# Patient Record
Sex: FEMALE
[De-identification: names, ages, dates, MRNs, and addresses within clinical notes are randomized per-mention and may not be internally consistent; named-entity substitution may affect disease eponyms.]

---

## 2022-01-28 ENCOUNTER — APPOINTMENT (OUTPATIENT)
Dept: PEDIATRIC ALLERGY IMMUNOLOGY | Facility: CLINIC | Age: 52
End: 2022-01-28
Payer: COMMERCIAL

## 2022-01-28 VITALS
WEIGHT: 153.38 LBS | HEIGHT: 62 IN | TEMPERATURE: 97.3 F | DIASTOLIC BLOOD PRESSURE: 74 MMHG | BODY MASS INDEX: 28.22 KG/M2 | SYSTOLIC BLOOD PRESSURE: 110 MMHG

## 2022-01-28 DIAGNOSIS — L29.9 PRURITUS, UNSPECIFIED: ICD-10-CM

## 2022-01-28 DIAGNOSIS — L50.8 OTHER URTICARIA: ICD-10-CM

## 2022-01-28 PROBLEM — Z00.00 ENCOUNTER FOR PREVENTIVE HEALTH EXAMINATION: Status: ACTIVE | Noted: 2022-01-28

## 2022-01-28 PROCEDURE — 99203 OFFICE O/P NEW LOW 30 MIN: CPT

## 2022-01-28 NOTE — HISTORY OF PRESENT ILLNESS
[de-identified] : KYRIE BRADFORD is a 51 year female  with a history of hives, itchy skin. Pt states she started feeling itchy skin about a month ago, after scratching she developed black and blue and it took 2 weeks to get better. Itching was worse at night, slightly raise lesions. She is seeing her PCP a few days later whom gave her a dose of methyl dose pack. Itching as well as rash improved and black and blues cleared up. Few days after completion of methyl dose pack itching came back on upper arms, chest, with no black and blue marks this time. Change of temperature make her itch more, at no point she had swelling of the tongue, throat and lip.

## 2022-01-28 NOTE — SOCIAL HISTORY
[House] : [unfilled] lives in a house  [Central Forced Air] : heating provided by central forced air [Central] : air conditioning provided by central unit [Dog] : dog [Humidifier] : does not use a humidifier [Dehumidifier] : does not use a dehumidifier [Cockroaches] : Patient states that there are no cockroaches in the home [Dust Mite Covers] : does not have dust mite covers [Feather Pillows] : does not have feather pillows [Feather Comforter] : does not have a feather comforter [Bedroom] : not in the bedroom [Basement] : not in the basement [Living Area] : not in the living area

## 2022-01-28 NOTE — REASON FOR VISIT
[Initial Evaluation] : an initial evaluation of [Allergy Evaluation/ Skin Testing] : allergy evaluation and or skin testing [Family Member] : family member [Hives] : hives

## 2022-01-28 NOTE — ASSESSMENT
[FreeTextEntry1] : Pt is a 51 year old female with a history of itchy skin without any constitutional symptoms. Screening work up done by PCP did not revealed anything significant pointing to the cause of rash. I think she has a non significant pruritus and mild urticaria. I will treat her with Zyrtec 10 mg daily and Benadryl 25 mg as needed for the next several weeks and I advised her to avoid NSAIDs. She will call back in 5 days to give status.

## 2022-11-02 ENCOUNTER — APPOINTMENT (OUTPATIENT)
Dept: SURGERY | Facility: CLINIC | Age: 52
End: 2022-11-02

## 2022-11-02 VITALS
HEIGHT: 62 IN | TEMPERATURE: 97 F | SYSTOLIC BLOOD PRESSURE: 122 MMHG | BODY MASS INDEX: 29.26 KG/M2 | HEART RATE: 76 BPM | DIASTOLIC BLOOD PRESSURE: 80 MMHG | WEIGHT: 159 LBS | OXYGEN SATURATION: 98 %

## 2022-11-02 DIAGNOSIS — I10 ESSENTIAL (PRIMARY) HYPERTENSION: ICD-10-CM

## 2022-11-02 DIAGNOSIS — Z78.9 OTHER SPECIFIED HEALTH STATUS: ICD-10-CM

## 2022-11-02 PROCEDURE — 99203 OFFICE O/P NEW LOW 30 MIN: CPT

## 2022-11-02 NOTE — ASSESSMENT
[FreeTextEntry1] : 52F with anal fissure\par \par I discussed the pathology of anal fissure with the patient.  We discussed starting a fiber supplement, increasing water intake and using nifedipine cream.  We will see the patient back in 2 months.\par

## 2022-11-02 NOTE — HISTORY OF PRESENT ILLNESS
[FreeTextEntry1] : PT been seen for initial evaluation for hemorrhoids. Pt has PMH of HTN. PSH of gastric sleeve 7-8 years ago. PT reports about 3 years ago she started having hemorrhoidal bleeding on/off. Over the past 9 months bleeding started becoming more constant with spotting on Toilet paper with every BM. Reports Bm's 2x a week. PT reports poor fiber and poor water intake. Pt denies fever, chills, nausea, vomiting, abdominal pain, diarrhea, blood in stool, or unexpected weight loss. Pt denies a family history of colon cancer, rectal cancer, or inflammatory bowel disease. last colonoscopy 3 years ago when bleeding started with MD in [place]. PT does not remember doctors name or the office but reports the results were just hemorrhoids.

## 2022-11-02 NOTE — PHYSICAL EXAM
[Posterior] : posteriorly [Excoriation] : no perianal excoriation [Fistula] : no fistulas [Wart] : no warts [Ulcer ___ cm] : no ulcers [Pilonidal Cyst] : no pilonidal cysts [Tender, Swollen] : nontender, non-swollen [Thrombosed] : that was not thrombosed [Skin Tags] : there were no residual hemorrhoidal skin tags seen [Respiratory Effort] : normal respiratory effort [Normal Rate and Rhythm] : normal rate and rhythm [de-identified] : external exam shows a posterior midline fissure [de-identified] : awake, alert and in no acute distress

## 2023-01-04 ENCOUNTER — APPOINTMENT (OUTPATIENT)
Dept: SURGERY | Facility: CLINIC | Age: 53
End: 2023-01-04

## 2023-02-22 ENCOUNTER — APPOINTMENT (OUTPATIENT)
Dept: SURGERY | Facility: CLINIC | Age: 53
End: 2023-02-22
Payer: COMMERCIAL

## 2023-02-22 VITALS
TEMPERATURE: 97 F | BODY MASS INDEX: 29.26 KG/M2 | WEIGHT: 159 LBS | SYSTOLIC BLOOD PRESSURE: 110 MMHG | HEART RATE: 73 BPM | DIASTOLIC BLOOD PRESSURE: 76 MMHG | HEIGHT: 62 IN | OXYGEN SATURATION: 99 %

## 2023-02-22 PROCEDURE — 99214 OFFICE O/P EST MOD 30 MIN: CPT

## 2023-03-06 NOTE — HISTORY OF PRESENT ILLNESS
[FreeTextEntry1] : Patient is a 52-year-old female with a past medical history of hypertension, past surgical history of Gastric Sleeve who presents her follow-up of Anal fissure. On patient last visit she was found to have a posterior anal fissure and recommended conservative management with Nifedipine cream. She reports bowel movements that are soft every 1 to 2 days with fiber intake. She has had significant improvement of her pain but continues to have mild pain with bowel movements. Pt denies fever, chills, nausea, vomiting, abdominal pain, diarrhea, blood in stool, or unexpected weight loss. Pt denies a family history of colon cancer, rectal cancer, or inflammatory bowel disease. last colonoscopy 3 years ago when bleeding started with MD in [place]. PT does not remember doctors name or the office but reports the results were just hemorrhoids.

## 2023-03-06 NOTE — PHYSICAL EXAM
[Respiratory Effort] : normal respiratory effort [Normal Rate and Rhythm] : normal rate and rhythm [de-identified] : external exam shows a posterior midline fissure. Anal fissure posteriorly. Perianal Skin: no perianal excoriation, no fistulas, no warts, no ulcers and no pilonidal cysts. Nontender, non-swollen internal hemorrhoid. Nontender, non-swollen external hemorrhoid that was not thrombosed . there were no residual hemorrhoidal skin tags seen.  [de-identified] : awake, alert and in no acute distress.

## 2023-03-06 NOTE — ASSESSMENT
[FreeTextEntry1] : 52 year old female with Anal Fissure.\par \par Despite treatment with conservative management and Nifedipine cream patient has persistent symptoms. We discussed examination of the anorectum under anesthesia, Fissurectomy and Botox injections. All risks, benefits, and alternatives were discussed including bleeding, infection, damage of the sphincter muscle resulting in a degree of incontinence to the gastric stool and recurrence. Patient expressed understanding and was in agreement with the plan.

## 2023-03-06 NOTE — ADDENDUM
[FreeTextEntry1] : I, Laurita Parker assisted in documentation on 02/22/2023 acting as a scribe for Dr. Dariel Ewing.\par

## 2023-03-21 ENCOUNTER — NON-APPOINTMENT (OUTPATIENT)
Age: 53
End: 2023-03-21

## 2023-05-10 ENCOUNTER — APPOINTMENT (OUTPATIENT)
Dept: SURGERY | Facility: CLINIC | Age: 53
End: 2023-05-10
Payer: COMMERCIAL

## 2023-05-10 VITALS
HEIGHT: 62 IN | HEART RATE: 75 BPM | WEIGHT: 150 LBS | DIASTOLIC BLOOD PRESSURE: 80 MMHG | SYSTOLIC BLOOD PRESSURE: 122 MMHG | TEMPERATURE: 96.9 F | OXYGEN SATURATION: 99 % | BODY MASS INDEX: 27.6 KG/M2

## 2023-05-10 DIAGNOSIS — K60.2 ANAL FISSURE, UNSPECIFIED: ICD-10-CM

## 2023-05-10 PROCEDURE — 99213 OFFICE O/P EST LOW 20 MIN: CPT

## 2023-05-22 NOTE — ASSESSMENT
[FreeTextEntry1] : 52-year-old female with anal fissure.\par \par The patient reports significant improvement in her symptoms. She has mild irritation after bowel movements. I recommend that she stopped the nifedipine cream and use topical barrier creams after her bowel movements to relieve the irritation. She will continue with her fiber supplementation. If she has worsening symptoms, she'll contact our office. We'll see her back as needed.\par \par

## 2023-05-22 NOTE — PHYSICAL EXAM
[FreeTextEntry1] : General: Awake & Alert, No Acute Distress\par Respiratory: Normal Respiratory Effort\par Cardiovascular: Normal Rate and Rhythm\par

## 2023-05-22 NOTE — HISTORY OF PRESENT ILLNESS
[FreeTextEntry1] : Patient is a 52-year-old female with a past medical history of hypertension, past surgical history of Gastric Sleeve who presents her follow-up of Anal fissure. On the patient's last visit, she was scheduled for a fissurectomy and Botox injection. Due to improvement in her condition, she canceled surgery. Today, patient reports mild irritation after bowel movements that is intermittent. She is using the nifedipine cream occasionally. She presents to discuss treatment. Pt denies fever, chills, nausea, vomiting, abdominal pain, diarrhea, blood in stool, or unexpected weight loss. Pt denies a family history of colon cancer, rectal cancer, or inflammatory bowel disease. last colonoscopy 3 years ago when bleeding started with MD in [place]. PT does not remember doctors name or the office but reports the results were just hemorrhoids. \par \par

## 2023-05-22 NOTE — ADDENDUM
[FreeTextEntry1] : I, Sonal Davenport (scribe) assisted in filling out this chart under the dictation of Dr. Dariel Ewing on 05/10/2023\par \par

## 2023-08-31 ENCOUNTER — NON-APPOINTMENT (OUTPATIENT)
Age: 53
End: 2023-08-31

## 2023-09-01 ENCOUNTER — EMERGENCY (EMERGENCY)
Facility: HOSPITAL | Age: 53
LOS: 0 days | Discharge: HOME | End: 2023-09-02
Attending: STUDENT IN AN ORGANIZED HEALTH CARE EDUCATION/TRAINING PROGRAM
Payer: COMMERCIAL

## 2023-09-01 VITALS
TEMPERATURE: 98 F | DIASTOLIC BLOOD PRESSURE: 90 MMHG | SYSTOLIC BLOOD PRESSURE: 185 MMHG | OXYGEN SATURATION: 100 % | HEIGHT: 63 IN | HEART RATE: 71 BPM | WEIGHT: 154.98 LBS | RESPIRATION RATE: 19 BRPM

## 2023-09-01 DIAGNOSIS — I25.10 ATHEROSCLEROTIC HEART DISEASE OF NATIVE CORONARY ARTERY WITHOUT ANGINA PECTORIS: ICD-10-CM

## 2023-09-01 DIAGNOSIS — Z98.84 BARIATRIC SURGERY STATUS: ICD-10-CM

## 2023-09-01 DIAGNOSIS — I10 ESSENTIAL (PRIMARY) HYPERTENSION: ICD-10-CM

## 2023-09-01 DIAGNOSIS — Z79.82 LONG TERM (CURRENT) USE OF ASPIRIN: ICD-10-CM

## 2023-09-01 DIAGNOSIS — R91.1 SOLITARY PULMONARY NODULE: ICD-10-CM

## 2023-09-01 DIAGNOSIS — R07.89 OTHER CHEST PAIN: ICD-10-CM

## 2023-09-01 PROCEDURE — 93005 ELECTROCARDIOGRAM TRACING: CPT

## 2023-09-01 PROCEDURE — 99284 EMERGENCY DEPT VISIT MOD MDM: CPT

## 2023-09-01 PROCEDURE — 71045 X-RAY EXAM CHEST 1 VIEW: CPT

## 2023-09-01 PROCEDURE — 85025 COMPLETE CBC W/AUTO DIFF WBC: CPT

## 2023-09-01 PROCEDURE — 84703 CHORIONIC GONADOTROPIN ASSAY: CPT

## 2023-09-01 PROCEDURE — G0378: CPT

## 2023-09-01 PROCEDURE — 75574 CT ANGIO HRT W/3D IMAGE: CPT | Mod: MA

## 2023-09-01 PROCEDURE — 93010 ELECTROCARDIOGRAM REPORT: CPT

## 2023-09-01 PROCEDURE — 36415 COLL VENOUS BLD VENIPUNCTURE: CPT

## 2023-09-01 PROCEDURE — 99285 EMERGENCY DEPT VISIT HI MDM: CPT

## 2023-09-01 PROCEDURE — 83880 ASSAY OF NATRIURETIC PEPTIDE: CPT

## 2023-09-01 PROCEDURE — 80053 COMPREHEN METABOLIC PANEL: CPT

## 2023-09-01 PROCEDURE — 84484 ASSAY OF TROPONIN QUANT: CPT

## 2023-09-01 NOTE — ED ADULT TRIAGE NOTE - CHIEF COMPLAINT QUOTE
" I have pain on my left chest since this afternoon." Sent from [facility] [place]

## 2023-09-01 NOTE — ED ADULT TRIAGE NOTE - ARRIVAL INFO ADDITIONAL COMMENTS
Aleve 1 tab @ 7:45 pm at home,  mg @ 10:30 pm from [facility] [place]

## 2023-09-01 NOTE — ED ADULT NURSE NOTE - NSFALLUNIVINTERV_ED_ALL_ED
Bed/Stretcher in lowest position, wheels locked, appropriate side rails in place/Call bell, personal items and telephone in reach/Instruct patient to call for assistance before getting out of bed/chair/stretcher/Non-slip footwear applied when patient is off stretcher/[place] to call system/Physically safe environment - no spills, clutter or unnecessary equipment/Purposeful proactive rounding/Room/bathroom lighting operational, light cord in reach

## 2023-09-01 NOTE — ED ADULT NURSE NOTE - CHIEF COMPLAINT QUOTE
" I have pain on my left chest since this afternoon." Sent from [facility] [place]

## 2023-09-01 NOTE — ED ADULT NURSE NOTE - OBJECTIVE STATEMENT
" I have pain on my left chest since this afternoon." Sent from [facility] [place]

## 2023-09-02 VITALS — HEART RATE: 56 BPM

## 2023-09-02 LAB
ALBUMIN SERPL ELPH-MCNC: 4.3 G/DL — SIGNIFICANT CHANGE UP (ref 3.5–5.2)
ALP SERPL-CCNC: 135 U/L — HIGH (ref 30–115)
ALT FLD-CCNC: 44 U/L — HIGH (ref 0–41)
ANION GAP SERPL CALC-SCNC: 8 MMOL/L — SIGNIFICANT CHANGE UP (ref 7–14)
AST SERPL-CCNC: 104 U/L — HIGH (ref 0–41)
BASOPHILS # BLD AUTO: 0.06 K/UL — SIGNIFICANT CHANGE UP (ref 0–0.2)
BASOPHILS NFR BLD AUTO: 0.8 % — SIGNIFICANT CHANGE UP (ref 0–1)
BILIRUB SERPL-MCNC: 0.4 MG/DL — SIGNIFICANT CHANGE UP (ref 0.2–1.2)
BUN SERPL-MCNC: 17 MG/DL — SIGNIFICANT CHANGE UP (ref 10–20)
CALCIUM SERPL-MCNC: 9.3 MG/DL — SIGNIFICANT CHANGE UP (ref 8.4–10.5)
CHLORIDE SERPL-SCNC: 102 MMOL/L — SIGNIFICANT CHANGE UP (ref 98–110)
CO2 SERPL-SCNC: 29 MMOL/L — SIGNIFICANT CHANGE UP (ref 17–32)
CREAT SERPL-MCNC: 0.8 MG/DL — SIGNIFICANT CHANGE UP (ref 0.7–1.5)
EGFR: 88 ML/MIN/1.73M2 — SIGNIFICANT CHANGE UP
EOSINOPHIL # BLD AUTO: 0.45 K/UL — SIGNIFICANT CHANGE UP (ref 0–0.7)
EOSINOPHIL NFR BLD AUTO: 5.8 % — SIGNIFICANT CHANGE UP (ref 0–8)
GLUCOSE SERPL-MCNC: 84 MG/DL — SIGNIFICANT CHANGE UP (ref 70–99)
HCG SERPL QL: NEGATIVE — SIGNIFICANT CHANGE UP
HCT VFR BLD CALC: 39.1 % — SIGNIFICANT CHANGE UP (ref 37–47)
HGB BLD-MCNC: 12.7 G/DL — SIGNIFICANT CHANGE UP (ref 12–16)
IMM GRANULOCYTES NFR BLD AUTO: 0.1 % — SIGNIFICANT CHANGE UP (ref 0.1–0.3)
LYMPHOCYTES # BLD AUTO: 2.58 K/UL — SIGNIFICANT CHANGE UP (ref 1.2–3.4)
LYMPHOCYTES # BLD AUTO: 33.3 % — SIGNIFICANT CHANGE UP (ref 20.5–51.1)
MCHC RBC-ENTMCNC: 30.3 PG — SIGNIFICANT CHANGE UP (ref 27–31)
MCHC RBC-ENTMCNC: 32.5 G/DL — SIGNIFICANT CHANGE UP (ref 32–37)
MCV RBC AUTO: 93.3 FL — SIGNIFICANT CHANGE UP (ref 81–99)
MONOCYTES # BLD AUTO: 0.64 K/UL — HIGH (ref 0.1–0.6)
MONOCYTES NFR BLD AUTO: 8.3 % — SIGNIFICANT CHANGE UP (ref 1.7–9.3)
NEUTROPHILS # BLD AUTO: 4.01 K/UL — SIGNIFICANT CHANGE UP (ref 1.4–6.5)
NEUTROPHILS NFR BLD AUTO: 51.7 % — SIGNIFICANT CHANGE UP (ref 42.2–75.2)
NRBC # BLD: 0 /100 WBCS — SIGNIFICANT CHANGE UP (ref 0–0)
NT-PROBNP SERPL-SCNC: 31 PG/ML — SIGNIFICANT CHANGE UP (ref 0–300)
PLATELET # BLD AUTO: 228 K/UL — SIGNIFICANT CHANGE UP (ref 130–400)
PMV BLD: 10.4 FL — SIGNIFICANT CHANGE UP (ref 7.4–10.4)
POTASSIUM SERPL-MCNC: 4.1 MMOL/L — SIGNIFICANT CHANGE UP (ref 3.5–5)
POTASSIUM SERPL-SCNC: 4.1 MMOL/L — SIGNIFICANT CHANGE UP (ref 3.5–5)
PROT SERPL-MCNC: 7 G/DL — SIGNIFICANT CHANGE UP (ref 6–8)
RBC # BLD: 4.19 M/UL — LOW (ref 4.2–5.4)
RBC # FLD: 12.8 % — SIGNIFICANT CHANGE UP (ref 11.5–14.5)
SODIUM SERPL-SCNC: 139 MMOL/L — SIGNIFICANT CHANGE UP (ref 135–146)
TROPONIN T SERPL-MCNC: <0.01 NG/ML — SIGNIFICANT CHANGE UP
TROPONIN T SERPL-MCNC: <0.01 NG/ML — SIGNIFICANT CHANGE UP
WBC # BLD: 7.75 K/UL — SIGNIFICANT CHANGE UP (ref 4.8–10.8)
WBC # FLD AUTO: 7.75 K/UL — SIGNIFICANT CHANGE UP (ref 4.8–10.8)

## 2023-09-02 PROCEDURE — 71045 X-RAY EXAM CHEST 1 VIEW: CPT | Mod: 26

## 2023-09-02 PROCEDURE — 75574 CT ANGIO HRT W/3D IMAGE: CPT | Mod: 26,MA

## 2023-09-02 PROCEDURE — 99236 HOSP IP/OBS SAME DATE HI 85: CPT

## 2023-09-02 PROCEDURE — 93010 ELECTROCARDIOGRAM REPORT: CPT

## 2023-09-02 RX ORDER — ASPIRIN/CALCIUM CARB/MAGNESIUM 324 MG
1 TABLET ORAL
Qty: 30 | Refills: 0
Start: 2023-09-02 | End: 2023-10-01

## 2023-09-02 RX ORDER — ATORVASTATIN CALCIUM 80 MG/1
1 TABLET, FILM COATED ORAL
Qty: 30 | Refills: 0
Start: 2023-09-02 | End: 2023-10-01

## 2023-09-02 RX ORDER — SODIUM CHLORIDE 9 MG/ML
1000 INJECTION INTRAMUSCULAR; INTRAVENOUS; SUBCUTANEOUS ONCE
Refills: 0 | Status: COMPLETED | OUTPATIENT
Start: 2023-09-02 | End: 2023-09-02

## 2023-09-02 RX ORDER — METOPROLOL TARTRATE 50 MG
100 TABLET ORAL ONCE
Refills: 0 | Status: COMPLETED | OUTPATIENT
Start: 2023-09-02 | End: 2023-09-02

## 2023-09-02 RX ADMIN — SODIUM CHLORIDE 1000 MILLILITER(S): 9 INJECTION INTRAMUSCULAR; INTRAVENOUS; SUBCUTANEOUS at 07:46

## 2023-09-02 RX ADMIN — Medication 100 MILLIGRAM(S): at 07:46

## 2023-09-02 NOTE — ED PROVIDER NOTE - NS ED ATTENDING STATEMENT MOD
This was a shared visit with the IKER. I reviewed and verified the documentation and independently performed the documented:

## 2023-09-02 NOTE — ED PROVIDER NOTE - ATTENDING APP SHARED VISIT CONTRIBUTION OF CARE
I personally evaluated the patient. I reviewed the Resident’s or Physician Assistant’s note (as assigned above), and agree with the findings and plan except as documented in my note.  53-year-old female with history of HTN, TIA, gastric sleeve surgery presents with complaints of left-sided chest pain with onset today.  Patient reports radiation to the left.  States the pain is intermittent and has no exacerbating factors.  Patient reports having a similar episode 1 week ago that lasted for 2 days and then resolved.  Patient has a cardiologist, Dr. Ramos, and has a pending appointment in 10 days.  Denies cardiac history, family history of CAD, no history of smoking.  VSS, non toxic appearing, NAD, Head NCAT, MMM, neck supple, normal ROM, normal s1s2, lungs ctab, abd s/nt/nd, no guarding or rebound, extremities wnl, AAO x 3, GCS 15, neuro grossly normal. No acute skin lesions. Plan is EKG, labs, cardiac monitor, meds as needed and reassess.

## 2023-09-02 NOTE — ED CDU PROVIDER DISPOSITION NOTE - CLINICAL COURSE
pt w/ CP. No acute OBS events. serial trop neg. EKG non ischemic. CCTA CADRAD 1. Discussed all available results with Pt.  Pt understands results, plan of care, outpt cardio follow up as discussed, and signs and symptoms for ED return.  Pt is comfortable with discharge. DC home.

## 2023-09-02 NOTE — ED CDU PROVIDER DISPOSITION NOTE - CARE PROVIDER_API CALL
follow up with your primary doctor,   Phone: (   )    -  Fax: (   )    -  Follow Up Time: 4-6 Days    Nery Ponce  Interventional Cardiology  [facility] [place] [address]  Phone: [phone]  Fax: [phone]  Follow Up Time: 4-6 Days

## 2023-09-02 NOTE — ED CDU PROVIDER DISPOSITION NOTE - PATIENT PORTAL LINK FT
You can access the FollowMyHealth Patient Portal offered by [facility] by registering at the following website: [email or web address]. By joining [facility]’s FollowMyHealth portal, you will also be able to view your health information using other applications (apps) compatible with our system.

## 2023-09-02 NOTE — ED CDU PROVIDER DISPOSITION NOTE - NSFOLLOWUPINSTRUCTIONS_ED_ALL_ED_FT
PLEASE BEGIN TAKING ASPIRIN DAILY  PLEASE BEGIN TAKING ATORVASTATIN DAILY  PLEASE FOLLOW UP WITH CARDIOLOGY  PLEASE FOLLOW UP WITH YOUR PRIMARY DOCTOR FOR CHEST CT IN 6-8 WEEKS    Chest Pain    Chest pain can be caused by many different conditions which may or may not be dangerous. Causes include heartburn, lung infections, heart attack, blood clot in lungs, skin infections, strain or damage to muscle, cartilage, or bones, etc. In addition to a history and physical examination, an electrocardiogram (ECG) or other lab tests may have been performed to determine the cause of your chest pain. Follow up with your primary care provider or with a cardiologist as instructed.     SEEK IMMEDIATE MEDICAL CARE IF YOU HAVE ANY OF THE FOLLOWING SYMPTOMS: worsening chest pain, coughing up blood, unexplained back/neck/jaw pain, severe abdominal pain, dizziness or lightheadedness, fainting, shortness of breath, sweaty or clammy skin, vomiting, or racing heart beat. These symptoms may represent a serious problem that is an emergency. Do not wait to see if the symptoms will go away. Get medical help right away. Call 911 and do not drive yourself to the hospital.    Coronary Artery Disease, Female  Coronary artery disease (CAD) is a condition in which the arteries that lead to the heart (coronary arteries) become narrow or blocked. The narrowing or blockage can lead to decreased blood flow to the heart. Prolonged reduced blood flow can cause a heart attack (myocardial infarction, or MI). This condition may also be called coronary heart disease.    CAD is the most common type of heart disease, and heart disease is the leading cause of death in women. It is important to understand what causes CAD and how it is treated.    What are the causes?  Series of blood vessels showing a gradual buildup of plaque that causes the blood vessel to become narrow and then blocked.  CAD is most often caused by atherosclerosis. This is the buildup of fat and cholesterol (plaque) on the inside of the arteries. Over time, the plaque may narrow or block the artery, reducing blood flow to the heart. Plaque can also become weak and break off within a coronary artery and cause a sudden blockage. Other less common causes of CAD include:  A blood clot or a piece of another substance that blocks the flow of blood in a coronary artery (embolism).  A tearing of the artery (spontaneous coronary artery dissection).  An enlargement of an artery (aneurysm).  Inflammation (vasculitis) in the artery wall.  What increases the risk?  The following factors may make you more likely to develop this condition:  Age. Women older than 55 years are at a greater risk of CAD.  Family history of CAD.  High blood pressure (hypertension).  Diabetes.  High cholesterol levels.  Obesity.  Menopause.  All postmenopausal women are at greater risk of CAD.  Women who have experienced menopause between the ages of 40 and 45 (early menopause) are at a higher risk of CAD.  Women who have experienced menopause before age 40 (premature menopause) are at a very high risk of CAD.  Other risk factors include:  Tobacco use.  Excessive alcohol use.  Lack of exercise.  A diet high in saturated and trans fats, such as fried food and processed meat.  What are the signs or symptoms?  Many people do not have any symptoms during the early stages of CAD. As the condition progresses, symptoms may include:  Chest pain (angina). The pain can:  Feel like crushing or squeezing, or like a tightness, pressure, fullness, or heaviness in the chest.  Last more than a few minutes or can stop and recur. The pain tends to get worse with exercise or stress and to fade with rest.  Pain in the arms, neck, jaw, ear, or back.  Unexplained heartburn or indigestion.  Shortness of breath.  Nausea.  Sudden light-headedness.  Sudden cold sweats.  Fluttering or fast heartbeat (palpitations).  Many women have chest discomfort and the other symptoms. However, women often have unusual (atypical) symptoms, such as:  Fatigue.  Vomiting.  Unexplained feelings of nervousness or anxiety.  Unexplained weakness.  Dizziness or fainting.  How is this diagnosed?  This condition is diagnosed based on:  Your family and medical history.  A physical exam.  Tests. These may include:  A test to check the electrical signals in your heart (electrocardiogram).  Exercise stress test. This looks for signs of blockage when the heart is stressed with exercise, such as running on a treadmill.  Pharmacologic stress test. This test looks for signs of blockage when the heart is being stressed with a medicine.  Blood tests to check levels of cardiac enzymes such as troponin and creatine kinase.  Coronary angiogram. This is a procedure to look at the coronary arteries to see if there is any blockage. During this test, a dye is injected into your arteries so they appear on an X-ray.  Coronary artery CT scan. This scan helps detect calcium deposits in your coronary arteries. Calcium deposits are an indicator of CAD.  A test that uses sound waves to take a picture of your heart (echocardiogram).  How is this treated?  This condition may be treated by:  Healthy lifestyle changes to reduce risk factors.  Medicines such as:  Antiplatelet medicines such as clopidogrel or aspirin. These help to prevent blood clots.  Nitroglycerin.  Blood pressure medicines.  Cholesterol-lowering medicine.  Coronary angioplasty and stenting. During this procedure, a thin, flexible tube is inserted through a blood vessel and into a blocked artery. A balloon or similar device on the end of the tube is inflated to open up the artery. In some cases, a small, mesh tube (stent) is inserted into the artery to keep it open.  Coronary artery bypass surgery. During this surgery, veins or arteries from other parts of the body are used to create a bypass around the blockage and allow blood to reach your heart.  Follow these instructions at home:  Medicines    Take over-the-counter and prescription medicines only as told by your health care provider.  Do not take the following medicines unless your health care provider approves:  NSAIDs, such as ibuprofen, naproxen, or celecoxib.  Vitamin supplements that contain vitamin A, vitamin E, or both.  Hormone replacement therapy that contains estrogen with or without progestin.  Lifestyle    Follow an exercise program approved by your health care provider. Ask your health care provider if cardiac rehab is appropriate.  Maintain a healthy weight or lose weight as approved by your health care provider.  Learn to manage stress or try to limit your stress. Ask your health care provider for suggestions if you need help.  Get screened for depression and seek treatment, if needed.  Do not use any products that contain nicotine or tobacco. These products include cigarettes, chewing tobacco, and vaping devices, such as e-cigarettes. If you need help quitting, ask your health care provider.  Eating and drinking    A plate with examples of foods in a healthy diet.  Follow a heart-healthy diet. A dietitian can help educate you about healthy food options and changes. In general, eat plenty of fruits and vegetables, lean meats, and whole grains.  Avoid foods high in:  Sugar.  Salt (sodium).  Saturated fats, such as processed or fatty meat.  Trans fats, such as fried food.  Use healthy cooking methods such as roasting, grilling, broiling, baking, poaching, steaming, or stir-frying.  Do not drink alcohol if:  Your health care provider tells you not to drink.  You are pregnant, may be pregnant, or are planning to become pregnant.  If you drink alcohol:  Limit how much you have to 0–1 drink a day.  Know how much alcohol is in your drink. In the [place] one drink equals one 12 oz bottle of beer (355 mL), one 5 oz glass of wine (148 mL), or one 1½ oz glass of hard liquor (44 mL).  General instructions    Manage any other health conditions, such as high cholesterol, hypertension, and diabetes. These conditions affect your heart.  Your health care provider may ask you to monitor your blood pressure.  Keep all follow-up visits. This is important.  Get help right away if:  You have pain in your chest, neck, ear, arm, jaw, stomach, or back that:  Lasts more than a few minutes.  Is recurring.  Is not relieved by taking medicine under your tongue (sublingual nitroglycerin).  You have profuse sweating without cause.  You have unexplained:  Heartburn or indigestion.  Shortness of breath or difficulty breathing.  Fluttering or fast heartbeat (palpitations).  Fatigue or weakness.  Nausea or vomiting.  Feelings of nervousness or anxiety.  You have sudden light-headedness or dizziness.  You faint.  These symptoms may be an emergency. Get help right away. Call 911.  Do not wait to see if the symptoms will go away.  Do not drive yourself to the hospital.  Summary  Coronary artery disease (CAD) is a condition in which the arteries that lead to the heart (coronary arteries) become narrow or blocked. Prolonged reduced blood flow can cause a heart attack.  Many women have chest discomfort and other common symptoms of CAD. However, women often have unusual (atypical) symptoms, such as fatigue, vomiting, weakness, or dizziness.  CAD can be treated with lifestyle changes, medicines, coronary angioplasty or stents, coronary artery bypass surgery, or a combination of these treatments.  Keep all follow-up visits. This is important.  This information is not intended to replace advice given to you by your health care provider. Make sure you discuss any questions you have with your health care provider.

## 2023-09-02 NOTE — ED PROVIDER NOTE - OBJECTIVE STATEMENT
Pt is a 53 year old female with pmhx of htn, gastric sleeve x 8 years ago, and tia without any deficits presents for evaluation of left sided chest pain radiating to left arm since 3pm today. She states last week she had a similar episode with intermittent chest pain throughout the day which resolved. She exercised at the gym without recurrence of symptoms until today. She follows with a cardiologist and has an appointment on 9/11. She last had an echo done 1 year ago and is unsure of other testing. She denies any fever, cough, sore throat, N/V, sob, difficulty breathing, abdominal pain, or urinary complaints.

## 2023-09-02 NOTE — ED PROVIDER NOTE - PHYSICAL EXAMINATION
As Follows:  CONST: Well appearing in NAD  EYES: PERRL, EOMI, Sclera and conjunctiva clear.   CARD: No murmurs, rubs, or gallops; Normal rate and rhythm  RESP: BS Equal B/L, No wheezes, rhonchi or rales. No distress or accessory breathing  GI: Soft, non-tender, non-distended.  SKIN: Warm, dry, no acute rashes. MMM  NEURO: A&Ox3, No focal deficits. Strength and sensation intact. Steady gait

## 2023-09-02 NOTE — ED CDU PROVIDER DISPOSITION NOTE - PROVIDER TOKENS
FREE:[LAST:[follow up with your primary doctor],PHONE:[(   )    -],FAX:[(   )    -],FOLLOWUP:[4-6 Days]],PROVIDER:[TOKEN:[[id number]],FOLLOWUP:[4-6 Days]]

## 2023-09-02 NOTE — ED CDU PROVIDER INITIAL DAY NOTE - ATTENDING CONTRIBUTION TO CARE
I personally evaluated the patient. I reviewed the Resident’s or Physician Assistant’s note (as assigned above), and agree with the findings and plan except as documented in my note.  I personally evaluated the patient. I reviewed the Resident’s or Physician Assistant’s note (as assigned above), and agree with the findings and plan except as documented in my note.  53-year-old female with history of HTN, TIA, gastric sleeve surgery presents with complaints of left-sided chest pain with onset today.  Patient reports radiation to the left.  States the pain is intermittent and has no exacerbating factors.  Patient reports having a similar episode 1 week ago that lasted for 2 days and then resolved.  Patient has a cardiologist, Dr. Ramos, and has a pending appointment in 10 days.  Denies cardiac history, family history of CAD, no history of smoking.  VSS, non toxic appearing, NAD, Head NCAT, MMM, neck supple, normal ROM, normal s1s2, lungs ctab, abd s/nt/nd, no guarding or rebound, extremities wnl, AAO x 3, GCS 15, neuro grossly normal. No acute skin lesions. Labs, ekg, CXR without acute findings. Will place in EDOU for further evaluation and management.

## 2023-09-02 NOTE — ED PROVIDER NOTE - CLINICAL SUMMARY MEDICAL DECISION MAKING FREE TEXT BOX
Patient presented for evaluation of chest pain.  Required EKG, labs, imaging.  EKG without acute ischemic changes, negative times 1, chest x-ray without acute finding.  Patient to be placed in ED OU for further evaluation and management.

## 2023-09-02 NOTE — ED CDU PROVIDER INITIAL DAY NOTE - PROGRESS NOTE DETAILS
Patient placed in observation for chest pain work-up.  Pending CCTA. patient resting, no new complaints. ccta performed, waiting for results patient resting, discussed CCTA results including incidental pulmonary findings. pt will begin aspirin, atorvastatin, has appointment with Dr. Ponce next week, will follow up with PCP for chest CT in 6-8 weeks

## 2023-09-05 ENCOUNTER — TRANSCRIPTION ENCOUNTER (OUTPATIENT)
Age: 53
End: 2023-09-05

## 2023-10-18 ENCOUNTER — OUTPATIENT (OUTPATIENT)
Dept: OUTPATIENT SERVICES | Facility: HOSPITAL | Age: 53
LOS: 1 days | End: 2023-10-18
Payer: COMMERCIAL

## 2023-10-18 DIAGNOSIS — Z00.8 ENCOUNTER FOR OTHER GENERAL EXAMINATION: ICD-10-CM

## 2023-10-18 DIAGNOSIS — R07.9 CHEST PAIN, UNSPECIFIED: ICD-10-CM

## 2023-10-18 PROCEDURE — 71271 CT THORAX LUNG CANCER SCR C-: CPT

## 2023-10-18 PROCEDURE — 71271 CT THORAX LUNG CANCER SCR C-: CPT | Mod: 26

## 2023-10-19 DIAGNOSIS — R07.9 CHEST PAIN, UNSPECIFIED: ICD-10-CM

## 2024-11-13 ENCOUNTER — APPOINTMENT (OUTPATIENT)
Dept: SURGERY | Facility: CLINIC | Age: 54
End: 2024-11-13

## 2024-11-13 VITALS
OXYGEN SATURATION: 99 % | SYSTOLIC BLOOD PRESSURE: 126 MMHG | TEMPERATURE: 97 F | DIASTOLIC BLOOD PRESSURE: 80 MMHG | WEIGHT: 164 LBS | HEART RATE: 74 BPM | BODY MASS INDEX: 30.18 KG/M2 | HEIGHT: 62 IN

## 2024-11-13 DIAGNOSIS — Z86.79 PERSONAL HISTORY OF OTHER DISEASES OF THE CIRCULATORY SYSTEM: ICD-10-CM

## 2024-11-13 DIAGNOSIS — L29.0 PRURITUS ANI: ICD-10-CM

## 2024-11-13 PROCEDURE — 99213 OFFICE O/P EST LOW 20 MIN: CPT
